# Patient Record
Sex: FEMALE | Race: ASIAN | NOT HISPANIC OR LATINO | URBAN - METROPOLITAN AREA
[De-identification: names, ages, dates, MRNs, and addresses within clinical notes are randomized per-mention and may not be internally consistent; named-entity substitution may affect disease eponyms.]

---

## 2019-08-17 ENCOUNTER — EMERGENCY (EMERGENCY)
Facility: HOSPITAL | Age: 54
LOS: 1 days | Discharge: HOME | End: 2019-08-17
Attending: EMERGENCY MEDICINE
Payer: COMMERCIAL

## 2019-08-17 VITALS
HEIGHT: 61 IN | TEMPERATURE: 99 F | DIASTOLIC BLOOD PRESSURE: 60 MMHG | WEIGHT: 130.07 LBS | OXYGEN SATURATION: 99 % | HEART RATE: 61 BPM | SYSTOLIC BLOOD PRESSURE: 133 MMHG | RESPIRATION RATE: 20 BRPM

## 2019-08-17 LAB
BASE EXCESS BLDV CALC-SCNC: 4 MMOL/L — HIGH (ref -2–2)
BASOPHILS # BLD AUTO: 0.03 K/UL — SIGNIFICANT CHANGE UP (ref 0–0.2)
BASOPHILS NFR BLD AUTO: 0.8 % — SIGNIFICANT CHANGE UP (ref 0–2)
CA-I SERPL-SCNC: 1.14 MMOL/L — LOW (ref 1.15–1.33)
CHLORIDE BLDV-SCNC: 106 MMOL/L — SIGNIFICANT CHANGE UP (ref 98–107)
EOSINOPHIL # BLD AUTO: 0.05 K/UL — SIGNIFICANT CHANGE UP (ref 0–0.5)
EOSINOPHIL NFR BLD AUTO: 1.3 % — SIGNIFICANT CHANGE UP (ref 0–6)
GAS PNL BLDV: 146 MMOL/L — HIGH (ref 135–145)
GAS PNL BLDV: SIGNIFICANT CHANGE UP
GAS PNL BLDV: SIGNIFICANT CHANGE UP
GLUCOSE BLDV-MCNC: 180 MG/DL — HIGH (ref 70–99)
HCO3 BLDV-SCNC: 28 MMOL/L — HIGH (ref 20–26)
HCT VFR BLD CALC: 39.3 % — SIGNIFICANT CHANGE UP (ref 34.5–45)
HCT VFR BLDA CALC: 40 — SIGNIFICANT CHANGE UP (ref 39–50)
HGB BLD CALC-MCNC: 13.2 G/DL — SIGNIFICANT CHANGE UP (ref 11.5–15.5)
HGB BLD-MCNC: 12.4 G/DL — SIGNIFICANT CHANGE UP (ref 11.5–15.5)
IMM GRANULOCYTES NFR BLD AUTO: 0.3 % — SIGNIFICANT CHANGE UP (ref 0–1.5)
LACTATE BLDV-MCNC: 1.2 MMOL/L — SIGNIFICANT CHANGE UP (ref 0.5–2)
LYMPHOCYTES # BLD AUTO: 1.81 K/UL — SIGNIFICANT CHANGE UP (ref 1–3.3)
LYMPHOCYTES # BLD AUTO: 45.9 % — HIGH (ref 13–44)
MCHC RBC-ENTMCNC: 30.5 PG — SIGNIFICANT CHANGE UP (ref 27–34)
MCHC RBC-ENTMCNC: 31.6 GM/DL — LOW (ref 32–36)
MCV RBC AUTO: 96.6 FL — SIGNIFICANT CHANGE UP (ref 80–100)
MONOCYTES # BLD AUTO: 0.27 K/UL — SIGNIFICANT CHANGE UP (ref 0–0.9)
MONOCYTES NFR BLD AUTO: 6.9 % — SIGNIFICANT CHANGE UP (ref 2–14)
NEUTROPHILS # BLD AUTO: 1.77 K/UL — LOW (ref 1.8–7.4)
NEUTROPHILS NFR BLD AUTO: 44.8 % — SIGNIFICANT CHANGE UP (ref 43–77)
OTHER CELLS CSF MANUAL: 16 ML/DL — LOW (ref 18–22)
PCO2 BLDV: 60 MMHG — HIGH (ref 35–50)
PCP SPEC-MCNC: SIGNIFICANT CHANGE UP
PH BLDV: 7.33 — SIGNIFICANT CHANGE UP (ref 7.32–7.43)
PLATELET # BLD AUTO: 242 K/UL — SIGNIFICANT CHANGE UP (ref 150–400)
PO2 BLDV: 66 MMHG — HIGH (ref 25–45)
POTASSIUM BLDV-SCNC: 3.6 MMOL/L — SIGNIFICANT CHANGE UP (ref 3.4–4.5)
RBC # BLD: 4.07 M/UL — SIGNIFICANT CHANGE UP (ref 3.8–5.2)
RBC # FLD: 13 % — SIGNIFICANT CHANGE UP (ref 10.3–14.5)
SAO2 % BLDV: 92 % — SIGNIFICANT CHANGE UP
WBC # BLD: 3.94 K/UL — SIGNIFICANT CHANGE UP (ref 3.8–10.5)
WBC # FLD AUTO: 3.94 K/UL — SIGNIFICANT CHANGE UP (ref 3.8–10.5)

## 2019-08-17 PROCEDURE — 99285 EMERGENCY DEPT VISIT HI MDM: CPT

## 2019-08-17 PROCEDURE — 93010 ELECTROCARDIOGRAM REPORT: CPT

## 2019-08-17 NOTE — ED ADULT TRIAGE NOTE - CHIEF COMPLAINT QUOTE
patient was involved in an argument with son which made her feel depressed and she took 15 pills of Pioglitazone and 15 pills of aspirin 81mg. patient currently denies any suicide ideations, alert and oriented x3, son is in the waiting room. MD Blanton called to the bedside to evaluate. patient placed in a yellow gown and safety protocol initiated.

## 2019-08-18 DIAGNOSIS — F33.2 MAJOR DEPRESSIVE DISORDER, RECURRENT SEVERE WITHOUT PSYCHOTIC FEATURES: ICD-10-CM

## 2019-08-18 DIAGNOSIS — R69 ILLNESS, UNSPECIFIED: ICD-10-CM

## 2019-08-18 LAB
ALBUMIN SERPL ELPH-MCNC: 4.1 G/DL — SIGNIFICANT CHANGE UP (ref 3.3–5.2)
ALP SERPL-CCNC: 62 U/L — SIGNIFICANT CHANGE UP (ref 40–120)
ALT FLD-CCNC: 26 U/L — SIGNIFICANT CHANGE UP
AMPHET UR-MCNC: NEGATIVE — SIGNIFICANT CHANGE UP
ANION GAP SERPL CALC-SCNC: 10 MMOL/L — SIGNIFICANT CHANGE UP (ref 5–17)
APAP SERPL-MCNC: <7.5 UG/ML — LOW (ref 10–26)
APPEARANCE UR: CLEAR — SIGNIFICANT CHANGE UP
AST SERPL-CCNC: 21 U/L — SIGNIFICANT CHANGE UP
BACTERIA # UR AUTO: ABNORMAL
BARBITURATES UR SCN-MCNC: NEGATIVE — SIGNIFICANT CHANGE UP
BENZODIAZ UR-MCNC: NEGATIVE — SIGNIFICANT CHANGE UP
BILIRUB SERPL-MCNC: 0.8 MG/DL — SIGNIFICANT CHANGE UP (ref 0.4–2)
BILIRUB UR-MCNC: NEGATIVE — SIGNIFICANT CHANGE UP
BUN SERPL-MCNC: 13 MG/DL — SIGNIFICANT CHANGE UP (ref 8–20)
CALCIUM SERPL-MCNC: 9 MG/DL — SIGNIFICANT CHANGE UP (ref 8.6–10.2)
CHLORIDE SERPL-SCNC: 105 MMOL/L — SIGNIFICANT CHANGE UP (ref 98–107)
CO2 SERPL-SCNC: 27 MMOL/L — SIGNIFICANT CHANGE UP (ref 22–29)
COCAINE METAB.OTHER UR-MCNC: NEGATIVE — SIGNIFICANT CHANGE UP
COLOR SPEC: YELLOW — SIGNIFICANT CHANGE UP
CREAT SERPL-MCNC: 0.45 MG/DL — LOW (ref 0.5–1.3)
DIFF PNL FLD: ABNORMAL
EPI CELLS # UR: SIGNIFICANT CHANGE UP
ETHANOL SERPL-MCNC: <10 MG/DL — SIGNIFICANT CHANGE UP
GLUCOSE SERPL-MCNC: 193 MG/DL — HIGH (ref 70–115)
GLUCOSE UR QL: 250 MG/DL
KETONES UR-MCNC: NEGATIVE — SIGNIFICANT CHANGE UP
LEUKOCYTE ESTERASE UR-ACNC: NEGATIVE — SIGNIFICANT CHANGE UP
METHADONE UR-MCNC: NEGATIVE — SIGNIFICANT CHANGE UP
NITRITE UR-MCNC: NEGATIVE — SIGNIFICANT CHANGE UP
OPIATES UR-MCNC: NEGATIVE — SIGNIFICANT CHANGE UP
PCP UR-MCNC: NEGATIVE — SIGNIFICANT CHANGE UP
PH UR: 6.5 — SIGNIFICANT CHANGE UP (ref 5–8)
POTASSIUM SERPL-MCNC: 3.6 MMOL/L — SIGNIFICANT CHANGE UP (ref 3.5–5.3)
POTASSIUM SERPL-SCNC: 3.6 MMOL/L — SIGNIFICANT CHANGE UP (ref 3.5–5.3)
PROT SERPL-MCNC: 6.5 G/DL — LOW (ref 6.6–8.7)
PROT UR-MCNC: NEGATIVE MG/DL — SIGNIFICANT CHANGE UP
RBC CASTS # UR COMP ASSIST: SIGNIFICANT CHANGE UP /HPF (ref 0–4)
SALICYLATES SERPL-MCNC: <0.6 MG/DL — LOW (ref 10–20)
SODIUM SERPL-SCNC: 142 MMOL/L — SIGNIFICANT CHANGE UP (ref 135–145)
SP GR SPEC: 1.01 — SIGNIFICANT CHANGE UP (ref 1.01–1.02)
THC UR QL: NEGATIVE — SIGNIFICANT CHANGE UP
UROBILINOGEN FLD QL: NEGATIVE MG/DL — SIGNIFICANT CHANGE UP
WBC UR QL: SIGNIFICANT CHANGE UP

## 2019-08-18 PROCEDURE — 90792 PSYCH DIAG EVAL W/MED SRVCS: CPT

## 2019-08-18 RX ORDER — ASPIRIN/CALCIUM CARB/MAGNESIUM 324 MG
1 TABLET ORAL
Qty: 0 | Refills: 0 | DISCHARGE

## 2019-08-18 RX ORDER — LISINOPRIL 2.5 MG/1
1 TABLET ORAL
Qty: 0 | Refills: 0 | DISCHARGE

## 2019-08-18 RX ORDER — ASPIRIN/CALCIUM CARB/MAGNESIUM 324 MG
81 TABLET ORAL DAILY
Refills: 0 | Status: DISCONTINUED | OUTPATIENT
Start: 2019-08-18 | End: 2019-08-30

## 2019-08-18 RX ORDER — DEXTROSE 50 % IN WATER 50 %
50 SYRINGE (ML) INTRAVENOUS ONCE
Refills: 0 | Status: COMPLETED | OUTPATIENT
Start: 2019-08-18 | End: 2019-08-18

## 2019-08-18 RX ORDER — PIOGLITAZONE HYDROCHLORIDE 15 MG/1
1 TABLET ORAL
Qty: 0 | Refills: 0 | DISCHARGE

## 2019-08-18 RX ORDER — METFORMIN HYDROCHLORIDE 850 MG/1
1 TABLET ORAL
Qty: 0 | Refills: 0 | DISCHARGE

## 2019-08-18 RX ORDER — SODIUM CHLORIDE 9 MG/ML
1000 INJECTION, SOLUTION INTRAVENOUS
Refills: 0 | Status: DISCONTINUED | OUTPATIENT
Start: 2019-08-18 | End: 2019-08-18

## 2019-08-18 RX ORDER — LISINOPRIL 2.5 MG/1
2.5 TABLET ORAL DAILY
Refills: 0 | Status: DISCONTINUED | OUTPATIENT
Start: 2019-08-18 | End: 2019-08-30

## 2019-08-18 RX ADMIN — SODIUM CHLORIDE 100 MILLILITER(S): 9 INJECTION, SOLUTION INTRAVENOUS at 02:36

## 2019-08-18 RX ADMIN — Medication 50 MILLILITER(S): at 02:35

## 2019-08-18 NOTE — ED BEHAVIORAL HEALTH ASSESSMENT NOTE - RISK ASSESSMENT
Low-moderate Patient admits she did this to get attention she wanted and money. There is no psychiatric history. 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

## 2019-08-18 NOTE — ED PROVIDER NOTE - PROGRESS NOTE DETAILS
Discussed with Juan Jose from Toxicology. States that that pioglitazone will not cause hyperglycemia and amount of aspirin taken lightly will not cause significant toxicity. Recommends checking tox screen if aspirin level positive recommend rechecking aspirin in 2 hours x2 to make sure aspirin level is down trending. if 2 downtrending aspirins levels can clear for psych. Patient initially with hypoglycemic episode, fed patient. Now with stable glucose. Will clear patient for psych evaluation. Manjula Arce DO Discussed with Juan Jose from Toxicology. States that pioglitazone will not cause hyperglycemia and amount of aspirin taken likely will not cause significant toxicity. Recommends checking tox screen if aspirin level positive recommend rechecking aspirin in 2 hours x2 to make sure aspirin level is down trending. if 2 downtrending aspirins levels can clear for psych.

## 2019-08-18 NOTE — ED PROVIDER NOTE - CLINICAL SUMMARY MEDICAL DECISION MAKING FREE TEXT BOX
55yo female with intentional overdose with ASA and pioglitazone. Will check labs including ASA level. D/w toxicology- if negative ASA level do not need to check level again. Pioglitazone should not cause hypoglycemia, however since we are checking labs on patient we will check FS Q1H until medically cleared, 1:1 observation, eventual psychiatric evaluation. Manjula Arce DO

## 2019-08-18 NOTE — ED BEHAVIORAL HEALTH NOTE - BEHAVIORAL HEALTH NOTE
social work note: called [facility]- no beds, Shradd[facility]- left a message, BRENDA - paged the on call pager, called [facility] spoke with [facility] , they have a bed , will fax clinical for review as requested.

## 2019-08-18 NOTE — ED BEHAVIORAL HEALTH ASSESSMENT NOTE - DESCRIPTION
See attached medical workup. 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 On weekends she visits mother in [facility] See attached medical workup.  After medical clearance admit to psychiatry on a 2pc.  Family is requesting outpatient services [facility] upon patients discharge. Patient speaks Mandarin, Cantonese and Fugianese

## 2019-08-18 NOTE — ED BEHAVIORAL HEALTH ASSESSMENT NOTE - OTHER
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

## 2019-08-18 NOTE — ED BEHAVIORAL HEALTH ASSESSMENT NOTE - SUMMARY
54 year old SF s/p overdose of Aspirin (10 tab, not found to have level) and Actos approximately 15 pills.  There is no prior history of psychiatric treatment or counseling. There are no legal issues. There is no history of alcohol or drug abuse. The patient currently denies SI/HI, plan or intent or impulses. She is not receptive to psychiatric treatment

## 2019-08-18 NOTE — ED BEHAVIORAL HEALTH NOTE - BEHAVIORAL HEALTH NOTE
social work note:  writer was informed by [facility] that the patient will need in patient psychiatric treatment.  called [facility] no beds. will pursue other facilities.

## 2019-08-18 NOTE — ED PROVIDER NOTE - OBJECTIVE STATEMENT
Hx of diabetes presenting with intentional overdose. pt had argument with son and took 15 Pioglitazone tabs (45 mg) as well as 15 Aspirin (81 mg) in attempt to end her life at 2200. Pt denies any symptoms and denies being suicidal currently. Pt denies ear ringing, chest pain, sob, diaphoresis.

## 2019-08-18 NOTE — ED ADULT NURSE NOTE - NSIMPLEMENTINTERV_GEN_ALL_ED
Implemented All Universal Safety Interventions:  [place] to call system. Call bell, personal items and telephone within reach. Instruct patient to call for assistance. Room bathroom lighting operational. Non-slip footwear when patient is off stretcher. Physically safe environment: no spills, clutter or unnecessary equipment. Stretcher in lowest position, wheels locked, appropriate side rails in place.

## 2019-08-18 NOTE — ED BEHAVIORAL HEALTH NOTE - BEHAVIORAL HEALTH NOTE
SWNote: p/c from Medina NP at [facility] to inform worker that case was reviewed and they have a bed however, unable to take without authorization . Worker called pt's insurance [employer] [phone] ,office closed (Sunday) . No response from [facility] case review, worker called/ NP attending cases in the ED unable to review this case as yet. Worker will be called with review outcome. SW to follow.

## 2019-08-18 NOTE — ED BEHAVIORAL HEALTH ASSESSMENT NOTE - DETAILS
pending bed availability na ER doc informed Had been arguing with her  and son all night, when they refused, she took overdose "to scare them"

## 2019-08-18 NOTE — ED PROVIDER NOTE - NSRISKOFTRANSFER_ED_A_ED
Transportation Risk (There is always a risk of traffic delays resulting in deterioration of condition.)/Deterioration of Condition En Route/Increased Pain/Death or Disability

## 2019-08-18 NOTE — ED ADULT NURSE REASSESSMENT NOTE - DESCRIPTION
patient rec'd to  ambulated with steady gait. Pt wand by security for contraband.  patient states that she took 15 DM pills and 15 ASA in attempt to hurt self after having an argument with son.  patient did not want to go into the reasons involved in the argument.  patient states does  not having any previous psych history, no inpatient admission, and states has not had any out patient services. patient states that she just moved back with son 2 days ago. 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  patient denies any HI/AH/VH at this time. patient making good eye contact patient wearing glasses. speaks at normal tone and appears comfort. patient explained thqat she will be seen by Psych this am and further care of plan will be madat that time. patient orientated to unit and contracted for safety

## 2019-08-18 NOTE — ED BEHAVIORAL HEALTH NOTE - BEHAVIORAL HEALTH NOTE
SWNote: all clinical paperwork faxed to [facility] ,case to be reviewed .Worker will be called with review outcome. SW to follow.
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
## 2019-08-18 NOTE — ED BEHAVIORAL HEALTH NOTE - BEHAVIORAL HEALTH NOTE
SWNote: p/c from pt's son Oskar Buenrostro ([phone]) to obtain info about pt's dispo plan and financial coverage . Informed about non available female beds at this moment and only obtained unable to send due to lack of auth (Insurance office closed). Informed that pt would like a hospital close to this area and [facility] will be called in the am for possible bed. Oskar would like a call to inform him about transfer outcome. Sw to follow.

## 2019-08-18 NOTE — ED ADULT NURSE REASSESSMENT NOTE - NSIMPLEMENTINTERV_GEN_ALL_ED
Implemented All Universal Safety Interventions:  [place] to call system. Call bell, personal items and telephone within reach. Instruct patient to call for assistance. Room bathroom lighting operational. Non-slip footwear when patient is off stretcher. Physically safe environment: no spills, clutter or unnecessary equipment. Stretcher in lowest position, wheels locked, appropriate side rails in place.

## 2019-08-18 NOTE — ED ADULT NURSE NOTE - OBJECTIVE STATEMENT
Patient presents s/p intentional overdose of prescription medication.  Patient is currently a&Ox4, 1:1 at bedside for safety.  Pt on cardiac monitor, normal sinus on cardiac monitor.  q1h fingersticks, pt currently >100.  Patient endorses depression and SI, not forthcoming with information at this time.  Rn expressed understanding.  will continue with frequent monitoring.

## 2019-08-18 NOTE — ED PROVIDER NOTE - PHYSICAL EXAMINATION
Gen: NAD, AOx3  Head: NCAT  HEENT: PERRL, oral mucosa moist, normal conjunctiva, oropharynx clear without exudate or erythema  Lung: CTAB, no respiratory distress, no wheezing, rales, rhonchi  CV: normal s1/s2, rrr, no murmurs, Normal perfusion, pulses 2+ throughout  Abd: soft, NTND, no CVA tenderness  MSK: No edema, no visible deformities, full range of motion in all 4 extremities  Neuro: CN II-XII grossly intact, No focal neurologic deficits  Skin: No rash   Psych: normal affect Gen: NAD, AOx3  Head: NCAT  HEENT: PERRL, oral mucosa moist, normal conjunctiva, oropharynx clear without exudate or erythema  Lung: CTAB, no respiratory distress, no wheezing, rales, rhonchi  CV: normal s1/s2, rrr, no murmurs, Normal perfusion  Abd: soft, NTND  MSK: No edema, no visible deformities, full range of motion in all 4 extremities  Neuro: CN II-XII grossly intact, No focal neurologic deficits  Skin: No rash   Psych: normal affect

## 2019-08-19 VITALS
HEART RATE: 70 BPM | TEMPERATURE: 98 F | RESPIRATION RATE: 18 BRPM | SYSTOLIC BLOOD PRESSURE: 108 MMHG | OXYGEN SATURATION: 100 % | DIASTOLIC BLOOD PRESSURE: 70 MMHG

## 2019-08-19 LAB
CULTURE RESULTS: SIGNIFICANT CHANGE UP
SPECIMEN SOURCE: SIGNIFICANT CHANGE UP

## 2019-08-19 PROCEDURE — 83605 ASSAY OF LACTIC ACID: CPT

## 2019-08-19 PROCEDURE — 82947 ASSAY GLUCOSE BLOOD QUANT: CPT

## 2019-08-19 PROCEDURE — 82330 ASSAY OF CALCIUM: CPT

## 2019-08-19 PROCEDURE — 80053 COMPREHEN METABOLIC PANEL: CPT

## 2019-08-19 PROCEDURE — 81001 URINALYSIS AUTO W/SCOPE: CPT

## 2019-08-19 PROCEDURE — 84132 ASSAY OF SERUM POTASSIUM: CPT

## 2019-08-19 PROCEDURE — 99285 EMERGENCY DEPT VISIT HI MDM: CPT | Mod: 25

## 2019-08-19 PROCEDURE — 82803 BLOOD GASES ANY COMBINATION: CPT

## 2019-08-19 PROCEDURE — 93005 ELECTROCARDIOGRAM TRACING: CPT

## 2019-08-19 PROCEDURE — 87086 URINE CULTURE/COLONY COUNT: CPT

## 2019-08-19 PROCEDURE — 85027 COMPLETE CBC AUTOMATED: CPT

## 2019-08-19 PROCEDURE — 36415 COLL VENOUS BLD VENIPUNCTURE: CPT

## 2019-08-19 PROCEDURE — 96374 THER/PROPH/DIAG INJ IV PUSH: CPT

## 2019-08-19 PROCEDURE — 82962 GLUCOSE BLOOD TEST: CPT

## 2019-08-19 PROCEDURE — 84295 ASSAY OF SERUM SODIUM: CPT

## 2019-08-19 PROCEDURE — 85014 HEMATOCRIT: CPT

## 2019-08-19 PROCEDURE — 80307 DRUG TEST PRSMV CHEM ANLYZR: CPT

## 2019-08-19 PROCEDURE — 82435 ASSAY OF BLOOD CHLORIDE: CPT

## 2019-08-19 RX ADMIN — Medication 81 MILLIGRAM(S): at 10:07

## 2019-08-19 NOTE — ED ADULT NURSE REASSESSMENT NOTE - COMFORT CARE
ambulated to bathroom/plan of care explained/warm blanket provided/meal provided/po fluids offered
plan of care explained
darkened lights/plan of care explained/po fluids offered/side rails down/warm blanket provided/meal provided
plan of care explained
po fluids offered/darkened lights/side rails up/plan of care explained/warm blanket provided

## 2019-08-19 NOTE — ED BEHAVIORAL HEALTH NOTE - BEHAVIORAL HEALTH NOTE
PEDRO LUIS Note: SW placed call to [facility] to follow up about bed availability. SW spoke to admissions who was aware of this case as it was presented to them last night, however they no longer have beds available. SW placed call to [facility], who stated they will call this writer back after morning meeting if there are beds available. No beds at [facility], no beds at . [place] has beds and will review the case, clinicals faxed over. SW will continue to follow for safe/appropriate trx to inpatient psych unit.

## 2019-08-19 NOTE — ED ADULT NURSE REASSESSMENT NOTE - GENERAL PATIENT STATE
comfortable appearance
comfortable appearance/resting/sleeping
comfortable appearance/resting/sleeping
no change observed/comfortable appearance
resting/sleeping/comfortable appearance/cooperative

## 2019-08-19 NOTE — ED ADULT NURSE REASSESSMENT NOTE - NS ED NURSE REASSESS COMMENT FT1
pt awake and alert a&o x3, provided list of medications, pt made aware of plan of care. pt calm and cooperative.
pt received sleeping but easily arousable, pt presents with a constricted affect and is calm and cooperative. pt admits to taking 15 actos and 10 baby aspirin. pt states that she did this because she got into an argument with her uncle. pt denies avh, or intent to harm herself again. pt agrees to contract for safety in . pt finger stick obtained 176
fingerstick remains stable between 150-200.  As per Dr. Arce, holding dextrose gtt to assess blood glucose.
p w/ fingerstick of 68 after previous fingerstick of 100 @ 0100.  Pt given PO food and juice, administered 1 amp dextrose 50% as ordered.  1:1 remains at bedside for safety.
Patient resting in bed at this time, no distress or complaints. Aware of pending transfer to inpatient facility. EMS transport called and expected within an hour to transfer pt to [facility].
Report called to  RICHELLE Alejandre.  Pt walked over to  with 1:1. finger stick and vital signs stable.
patient resting in darken room no c/o will continue to monitor and maintain safe environment
Assumed care of patient at 0720.  Patient resting in bed asleep with no distress.  Safety of patient maintained.
assumed care of patient . patient lying in bed resting offered dinner at 1930 and states that she was not hungry. offering no complaints . patient maintained in safe environment

## 2019-08-19 NOTE — ED BEHAVIORAL HEALTH NOTE - BEHAVIORAL HEALTH NOTE
PEDRO LUIS Note: PEDRO LUIS received call from [facility] and [facility] that there are beds and pt is accepted. PEDRO LUIS spoke with pt, who prefers [facility].  Pt is a 2PC. SO advised SW that pt can come this afternoon. SW placed call to son to alert of bed acceptance. SW will continue to follow to arrange transport and obtain auth. SW Note: SW received call from [place] and [facility] that there are beds and pt is accepted. SW spoke with pt, who prefers [facility].  Pt is a 2PC, accepting MD Dr. Claudio. [facility] advised SW that pt can come this afternoon. SW placed call to son to alert of bed acceptance. SW will continue to follow to arrange transport and obtain auth.

## 2020-11-23 NOTE — ED BEHAVIORAL HEALTH ASSESSMENT NOTE - NS ED BHA PLAN ADMIT TO PSYCHIATRY REFERRANT CONTACTED YN
FREE:[LAST:[Dr. Ramirez],PHONE:[(   )    -],FAX:[(   )    -],FOLLOWUP:[2 weeks],ESTABLISHEDPATIENT:[T]],PROVIDER:[TOKEN:[[id number]],FOLLOWUP:[1 month],ESTABLISHEDPATIENT:[T]]
Yes

## 2023-10-24 NOTE — ED BEHAVIORAL HEALTH ASSESSMENT NOTE - OCCUPATION
[facility] - Intensive Care ([place] [id number])  Nephrology  Progress Note    Patient Name: Cosme Lewis  MRN: [id number]  Admission Date: 10/11/2023  Hospital Length of Stay: 13 days  Attending Provider: Wiley Yoon DO   Primary Care Physician: Eliecer Ohara III, MD  Principal Problem:Acute on chronic respiratory failure with hypoxia and hypercapnia    Subjective:     HPI: Cosme Lewis is a 61 yo male with PMHx of ESRD on iHD MWF who presents from his NH with AMS, hypoxia, and hypoglycemia.  He was placed on BiPAP and transferred to ICU for higher level of care.  CXR with evidence of pulmonary edema, BNP elevated > 3800.  Labs on chemistry without emergent electrolyte abnormalities consistent with ESRD status.  CBC without leukocytosis.  He was found to be COVID + and started on Broad spectrum Abx and steroids. He required D5W gtt for persistent hypoglycemia.  Nephrology has been consulted for ESRD management while IP.      Interval History:   Stepped down from ICU, HD session already started today.    Review of patient's allergies indicates:   Allergen Reactions    Heparin analogues      HIT KARLOS weakly positive, ELISE pending     Current Facility-Administered Medications   Medication Frequency    0.9%  NaCl infusion Once    acetaminophen tablet 650 mg Q6H PRN    apixaban tablet 2.5 mg BID    atorvastatin tablet 40 mg Daily    carvediloL tablet 12.5 mg BID WM    dextrose 10% bolus 125 mL 125 mL PRN    dextrose 10% bolus 250 mL 250 mL PRN    epoetin xavier-epbx injection 3,510 Units Every Mon, Wed, Fri    FLUoxetine capsule 40 mg Daily    fluticasone furoate-vilanteroL 100-25 mcg/dose diskus inhaler 1 puff Daily    glucose chewable tablet 16 g PRN    glucose chewable tablet 24 g PRN    hydrALAZINE injection 10 mg Q6H PRN    insulin aspart U-100 pen 0-5 Units QID (AC + HS) PRN    insulin detemir U-100 (Levemir) pen 3 Units BID    melatonin tablet 9 mg Nightly PRN    multivitamin tablet Daily    NIFEdipine  24 hr tablet 60 mg BID    sodium chloride 0.9% bolus 250 mL 250 mL PRN    sodium chloride 0.9% bolus 250 mL 250 mL PRN    tiotropium bromide 2.5 mcg/actuation inhaler 2 puff Daily       Objective:     Vital Signs (Most Recent):  Temp: 97.5 °F (36.4 °C) (10/24/23 0830)  Pulse: (!) 116 (10/24/23 1100)  Resp: 18 (10/24/23 0414)  BP: (!) 143/68 (10/24/23 1100)  SpO2: 97 % (10/24/23 1100) Vital Signs (24h Range):  Temp:  [97.5 °F (36.4 °C)-98.2 °F (36.8 °C)] 97.5 °F (36.4 °C)  Pulse:  [] 116  Resp:  [15-30] 18  SpO2:  [87 %-100 %] 97 %  BP: (106-160)/(61-92) 143/68     Weight: 70.2 kg (154 lb 12.2 oz) (10/18/23 1100)  Body mass index is 22.85 kg/m².  Body surface area is 1.85 meters squared.    I/O last 3 completed shifts:  In: 550 [P.O.:550]  Out: 1 [Stool:1]     Physical Exam   Vitals and nursing note reviewed.   Constitutional:       General: He is not in acute distress.     Appearance: He is well-developed. He is ill-appearing.      Interventions: Nasal cannula in place.   HENT:      Head: Normocephalic and atraumatic.      Nose: No congestion or rhinorrhea.   Eyes:      General: No scleral icterus.        Right eye: No discharge.         Left eye: No discharge.      Extraocular Movements: Extraocular movements intact.      Conjunctiva/sclera: Conjunctivae normal.   Neck:      Vascular: JVD present.   Cardiovascular:      Rate and Rhythm: Normal rate and regular rhythm.      Heart sounds: No murmur heard.     No friction rub. No gallop.      Comments: UMA AVF  Pulmonary:      Effort: No respiratory distress.      Breath sounds: Rhonchi and rales present. No wheezing.   Abdominal:      General: There is no distension.      Palpations: Abdomen is soft.      Tenderness: There is no abdominal tenderness.   Musculoskeletal:         General: No swelling.      Cervical back: Normal range of motion.      Right lower leg: Edema present.      Left lower leg: Edema present.   Skin:     General: Skin is warm and dry.    Neurological:      Mental Status: He is alert. Mental status is at baseline.  Significant Labs:  BMP:   Recent Labs   Lab 10/24/23  0405   *   *   K 5.8*   CL 99   CO2 15*   BUN 78*   CREATININE 4.8*   CALCIUM 7.2*   MG 2.2     CBC:   Recent Labs   Lab 10/24/23  0405   WBC 4.98   RBC 2.80*   HGB 8.6*   HCT 24.7*   PLT 91*   MCV 88   MCH 30.7   MCHC 34.8     All labs within the past 24 hours have been reviewed.     Significant Imaging:  Labs: Reviewed    Assessment/Plan:     Renal/  ESRD (end stage renal disease)  ESRD on iHD MWF  [facility]  4 hours  EDW:  71 kg  UMA AVF    -Bedside Hd completed 10/20 , net uf 3 liters removed,  Plan/Recommendations:  -HD session today going, yesterday didn't get HD  -continue strict I/O's  -RFP daily  -renal diet when advanced, 1L fluid restrictions  -Phos WNL, no need for binders at this time    Anemia of chronic kidney disease;    Hemoglobin   Date Value Ref Range Status   10/24/2023 8.6 (L) 14.0 - 18.0 g/dL Final   10/23/2023 7.5 (L) 14.0 - 18.0 g/dL Final   10/22/2023 8.2 (L) 14.0 - 18.0 g/dL Final     Saturated Iron   Date Value Ref Range Status   08/22/2023 19 (L) 20 - 50 % Final   02/23/2023 19 (L) 20 - 50 % Final     Ferritin   Date Value Ref Range Status   08/22/2023 1,455 (H) 20.0 - 300.0 ng/mL Final   02/23/2023 1,803 (H) 20.0 - 300.0 ng/mL Final     Iron   Date Value Ref Range Status   08/22/2023 48 45 - 160 ug/dL Final   02/23/2023 39 (L) 45 - 160 ug/dL Final     TIBC   Date Value Ref Range Status   08/22/2023 249 (L) 250 - 450 ug/dL Final   02/23/2023 203 (L) 250 - 450 ug/dL Final     HB target 10-12    Mineral Bone Disease;    Calcium   Date Value Ref Range Status   10/24/2023 7.2 (L) 8.7 - 10.5 mg/dL Final   10/23/2023 7.2 (L) 8.7 - 10.5 mg/dL Final     Phosphorus   Date Value Ref Range Status   10/24/2023 5.8 (H) 2.7 - 4.5 mg/dL Final   10/23/2023 4.3 2.7 - 4.5 mg/dL Final            Thank you for your consult. I will follow-up with  patient. Please contact us if you have any additional questions.    Jessica Crocker MD  Nephrology  [facility] - Intensive Care ([facility]   in [employer]